# Patient Record
Sex: MALE | Race: WHITE | NOT HISPANIC OR LATINO | Employment: UNEMPLOYED | URBAN - METROPOLITAN AREA
[De-identification: names, ages, dates, MRNs, and addresses within clinical notes are randomized per-mention and may not be internally consistent; named-entity substitution may affect disease eponyms.]

---

## 2024-07-01 ENCOUNTER — OFFICE VISIT (OUTPATIENT)
Dept: URGENT CARE | Facility: URGENT CARE | Age: 24
End: 2024-07-01
Payer: COMMERCIAL

## 2024-07-01 VITALS
RESPIRATION RATE: 19 BRPM | SYSTOLIC BLOOD PRESSURE: 159 MMHG | HEART RATE: 144 BPM | BODY MASS INDEX: 25.87 KG/M2 | HEIGHT: 72 IN | DIASTOLIC BLOOD PRESSURE: 71 MMHG | WEIGHT: 191 LBS | TEMPERATURE: 98.1 F | OXYGEN SATURATION: 100 %

## 2024-07-01 DIAGNOSIS — L60.0 INGROWN TOENAIL OF LEFT FOOT: Primary | ICD-10-CM

## 2024-07-01 DIAGNOSIS — L03.032 PARONYCHIA OF TOE, LEFT: ICD-10-CM

## 2024-07-01 PROCEDURE — 99203 OFFICE O/P NEW LOW 30 MIN: CPT | Performed by: PHYSICIAN ASSISTANT

## 2024-07-01 NOTE — PROGRESS NOTES
"Patient presents with:  Nail Problem: Ingrown toenail, left foot first toe     (L60.0) Ingrown toenail of left foot  (primary encounter diagnosis)  Comment:   Plan: Orthopedic  Referral            (L03.032) Paronychia of toe, left  Comment: (infection)  Plan: amoxicillin-clavulanate (AUGMENTIN) 875-125 MG         tablet            Warm epsom salt soaks twice a day, rinse and pat dry, apply bacitracin and nonstick dressing.  Referral has been placed for podiatry.  I would expect they can see you after you are done with your antibiotic.        At the end of the encounter, I discussed results, diagnosis, medications. Discussed red flags for immediate return to clinic/ER, as well as indications for follow up if no improvement. Patient understood and agreed to plan. Patient was stable for discharge         SUBJECTIVE:   Husam Mills is a 24 year old male who presents with a painful growth on either side of his left great toe, present for \"a few months\".  Denies any trauma.      Denies any fevers.      Current Outpatient Medications   Medication Sig Dispense Refill    Multiple Vitamins-Iron (DAILY-ARAMIS/IRON/BETA-CAROTENE) TABS TAKE 1 TABLET BY MOUTH DAILY. (Patient not taking: Reported on 10/19/2020) 30 tablet 7     Social History     Tobacco Use    Smoking status: Never Smoker    Smokeless tobacco: Never Used   Substance Use Topics    Alcohol use: Not on file     Family History   Problem Relation Age of Onset    Diabetes Mother     Diabetes Father          ROS:    10 point ROS of systems including Constitutional, Eyes, Respiratory, Cardiovascular, Gastroenterology, Genitourinary, Integumentary, Muscularskeletal, Psychiatric ,neurological were all negative except for pertinent positives noted in my HPI       OBJECTIVE:  BP (!) 159/71   Pulse (!) 144   Temp 98.1  F (36.7  C) (Oral)   Resp 19   Ht 1.829 m (6')   Wt 86.6 kg (191 lb)   SpO2 100%   BMI 25.90 kg/m    Physical Exam:  GENERAL APPEARANCE: " healthy, alert and no distress  Extremities: Left great toe no bony abnormality.  No bony tenderness.  Erythema and granulomatous tissue at medial and lateral aspect of the left great toenail.  NEURO: Normal strength and tone, sensory exam grossly normal,  normal speech and mentation  SKIN: Erythema and granulomatous tissue at the medial and lateral aspect of the left great toenail.

## 2024-07-01 NOTE — PATIENT INSTRUCTIONS
(L60.0) Ingrown toenail of left foot  (primary encounter diagnosis)  Comment:   Plan: Orthopedic  Referral            (L03.032) Paronychia of toe, left  Comment: (infection)  Plan: amoxicillin-clavulanate (AUGMENTIN) 875-125 MG         tablet            Warm epsom salt soaks twice a day, rinse and pat dry, apply bacitracin and nonstick dressing.  Referral has been placed for podiatry.  I would expect they can see you after you are done with your antibiotic.

## 2024-07-12 ENCOUNTER — OFFICE VISIT (OUTPATIENT)
Dept: PODIATRY | Facility: CLINIC | Age: 24
End: 2024-07-12
Payer: COMMERCIAL

## 2024-07-12 VITALS — DIASTOLIC BLOOD PRESSURE: 79 MMHG | SYSTOLIC BLOOD PRESSURE: 164 MMHG | BODY MASS INDEX: 25.77 KG/M2 | WEIGHT: 190 LBS

## 2024-07-12 DIAGNOSIS — L60.0 INGROWN TOENAIL OF LEFT FOOT: ICD-10-CM

## 2024-07-12 PROCEDURE — 99203 OFFICE O/P NEW LOW 30 MIN: CPT | Mod: 25 | Performed by: PODIATRIST

## 2024-07-12 PROCEDURE — 11730 AVULSION NAIL PLATE SIMPLE 1: CPT | Mod: TA | Performed by: PODIATRIST

## 2024-07-12 NOTE — PATIENT INSTRUCTIONS
Thank you for choosing [facility] Podiatry / Foot & Ankle Surgery!    DR. CHATMAN'S CLINIC LOCATIONS:     [facility] (Friday) TRIAGE LINE: [phone]   [address] [facility]  APPOINTMENTS: [phone]   [place] [address] RADIOLOGY: [phone]    PHYSICAL THERAPY: [phone]    SET UP SURGERY: [phone]   [place] (Mon-Tues AM-Thurs) BILLING QUESTIONS: [phone]   [address] FAX: [phone]   [place] [address] [facility]: [phone]        - What is an ingrown toenail? An ingrown toenail is a medical condition usually caused by a nail edge irritating the neighboring soft tissue and skin. It can cause pain and lead to infection.  - What causes ingrown toenails? There are likely multiple causes of ingrown toenails, including nail shape and width, narrow shoes, a person s activity level, and likely family history of nail problems.  - Risks of not treating condition: If left untreated, some people endure ongoing tenderness and pain. The biggest concern is infection from bacteria entering through the damage soft tissue.  - How is it treated? Some people achieve relief by soaking their feet and trimming the edge of the nail. If an infection has developed, then an oral antibiotic can be prescribed, but the condition often returns after the course of the medication is completed. Often self treatment is not successful and treatment by a qualified medical provider is needed. This often involves numbing the toe with a local anesthetic and then either removing the edge of a nail or the entire nail.  - Risks of surgery? As with any form of surgery, infections is a risk. However, a person is probably at greater risk for infection if the ingrown toenail is left untreated. Nail removal is a common, simple, and fairly quick procedure that in most cases is done in the physician's office. If an infection exists, often the only treatment that is successful is removal.  "    Ingrown toenail Post-procedural instructions    - After the procedure, go home and elevate the involved foot for the remainder of the day/evening as able.  This is to minimize swelling, control pain, and limit post-procedural complications.  The pre-procedural injection may cause your toe to be numb anywhere from1-2 hours.    - You can take Tylenol, Ibuprofen, Advil, etc as needed for pain if tolerated.  Follow label instructions      - If you have been given a prescription for antibiotics, take them as instructed and complete the prescription.    - Keep dressing intact until the following morning.  At that point, you may remove the bandage (you may need to soak it in warm soapy water as the bandage will likely adhere to your skin).  Soaking in warm soapy water for 5-10 minutes will also facilitate healing.  Wash the toe thoroughly, dry the toe thoroughly.  Apply antibiotic wound ointment to base of wound and cover with 2x2 gauze pads and Coban dressing (not too tightly) until it stops draining(you'll need to purchase the 2x2 gauze pads and 1\" Coban rolls, especially if you had the permanent/chemical procedure performed).  This may take a few days to weeks, but at that point, you may continue with antibiotic ointment and a band-aid, or you may stop applying a dressing all together.  Dressing changes and soaks should be done twice daily(morning/evening) if you had the permanent/chemical procedure done.      -If you had the permanent procedure done, assess the toe at 4 weeks out from the procedure.  If the toe continues to heal/improve, continue twice-daily soaks and dressing changes, and on follow up is needed.  If the toe is no longer showing improvement at 4 weeks out from the procedure, follow up in clinic for a 30 minute appointment, and we'll clean out the procedure site.    - You may do activities to tolerance the following day.  Find a shoe that is comfortable and minimizes the amount of rubbing on your " toe, as this may increase pain, swelling, etc.  Utilize resting, icing, elevating and anti-inflammatories/Tylenol as needed.    - Monitor for signs of infection.  With this procedure, it is common to have mild surrounding redness and drainage.  If the redness involves the entire great toe or if you notice red streaks on top of your foot, or if you experience any nausea, vomiting, chills, fevers > 101 degrees, call clinic for a quick appointment.

## 2024-07-12 NOTE — PROGRESS NOTES
Foot & Ankle Surgery  July 12, 2024    CC: ingrown    I was asked to see Husam Mills regarding the chief complaint by:      HPI:  Pt is a 24 year old male who presents with above complaint.  The patient was seen in  7/1/24 for ingrown bilateral border L hallux.  He was given Augmentin and advised to soak.  The area is still very sore.  He's been soaking twice daily.    ROS:   Pos for CC.  The patient denies current nausea, vomiting, chills, fevers, belly pain, calf pain, chest pain or SOB.  Complete remainder of ROS is otherwise neg.    VITALS:    Vitals:    07/12/24 1525   BP: (!) 164/79   Weight: 86.2 kg (190 lb)       PMH:  No past medical history on file.    SXHX:  No past surgical history on file.     MEDS:    No current outpatient medications on file.     No current facility-administered medications for this visit.       ALL:   No Known Allergies    FMH:  No family history on file.    SocHx:    Social History     Socioeconomic History    Marital status: Single     Spouse name: Not on file    Number of children: Not on file    Years of education: Not on file    Highest education level: Not on file   Occupational History    Not on file   Tobacco Use    Smoking status: Never    Smokeless tobacco: Never   Substance and Sexual Activity    Alcohol use: Not on file    Drug use: Not on file    Sexual activity: Not on file   Other Topics Concern    Not on file   Social History Narrative    Not on file     Social Determinants of Health     Financial Resource Strain: Not on file   Food Insecurity: Not on file   Transportation Needs: Not on file   Physical Activity: Not on file   Stress: Not on file   Social Connections: Not on file   Interpersonal Safety: Not on file   Housing Stability: Not on file           EXAMINATION:  Gen:   No apparent distress  Neuro:   A&Ox3, no deficits  Psych:    Answering questions appropriately for age and situation with normal affect  Head:    NCAT  Eye:    Visual scanning  without deficit  Ear:    Response to auditory stimuli wnl  Lung:    Non-labored breathing on RA noted  Abd:    NTND per patient report  Lymph:    Neg for pitting/non-pitting edema BLE  Vasc:    Pulses palpable, CFT minimally delayed  Neuro:    Light touch sensation intact to all sensory nerve distributions without paresthesias  Derm:    Pronounced onychocryptosis bilateral border left hallux with associated paronychia  MSK:    ROM, strength wnl without limitation, no pain on palpation noted.  Calf:    Neg for redness, swelling or tenderness    Assessment:  24 year old male with pronounced onychocryptosis bilateral border left hallux with paronychia      Medical Decision Making/Plan:  Discussed etiologies, anatomy and options  1.  Mild onychocryptosis bilateral border left hallux with paronychia  -I personally reviewed and interpreted the patient's lower extremity history pertinent to today's visit, including imaging/labs, in preparation for initiating a treatment program.  -Regarding the nail, treatment options were discussed.  They elected to proceed with a procedure, Total temporary avulsion.  See procedure note for details.  Risks that were discussed include but are not limited to infection, wound healing complications, nerve irritation, recurrence of the ingrown nail and the need for further procedures.  Antibiotic:  none needed  -I advised against a permanent removal today based on granuloma and paronychia.  We discussed that if this starts to become symptomatic again, he should come in immediately for permanent removal of 1 or both borders    After discussing the procedure, as well as risks, complications and post-procedure instructions, informed consent was obtained.    Anesthesia:  6 cc's of  1% lidocaine plain    Procedure:  After adequate prep, and with anesthesia achieved,  attention was directed to the left hallux where the nail plate was freed from surrounding soft tissue and then removed in total.  The  base of the wound was explored and showed no necrotic tissue, purulence or debris.   A clean dressing was applied loosely to prevent vascular insult.  The patient tolerated the procedure well without complications.    Post-procedural instructions were dispensed and discussed with the patient.  All questions were answered.          Follow up:  prn or sooner with acute issues      Patient's medical history was reviewed today      Elvin Noonan DPM [facility]  Podiatric Foot & Ankle Surgeon  [facility]  [phone]    Disclaimer: This note consists of symbols derived from keyboarding, dictation and/or voice recognition software. As a result, there may be errors in the script that have gone undetected. Please consider this when interpreting information found in this chart.

## 2024-07-12 NOTE — LETTER
7/12/2024      Husma Landersnilam  [address]  [place] [address]      Dear Colleague,    Thank you for referring your patient, Husam Mills, to the [facility]. Please see a copy of my visit note below.    Foot & Ankle Surgery  July 12, 2024    CC: ingrown    I was asked to see Husam BOSTON Gene regarding the chief complaint by:      HPI:  Pt is a 24 year old male who presents with above complaint.  The patient was seen in  7/1/24 for ingrown bilateral border L hallux.  He was given Augmentin and advised to soak.  The area is still very sore.  He's been soaking twice daily.    ROS:   Pos for CC.  The patient denies current nausea, vomiting, chills, fevers, belly pain, calf pain, chest pain or SOB.  Complete remainder of ROS is otherwise neg.    VITALS:    Vitals:    07/12/24 1525   BP: (!) 164/79   Weight: 86.2 kg (190 lb)       PMH:  No past medical history on file.    SXHX:  No past surgical history on file.     MEDS:    No current outpatient medications on file.     No current facility-administered medications for this visit.       ALL:   No Known Allergies    FMH:  No family history on file.    SocHx:    Social History     Socioeconomic History     Marital status: Single     Spouse name: Not on file     Number of children: Not on file     Years of education: Not on file     Highest education level: Not on file   Occupational History     Not on file   Tobacco Use     Smoking status: Never     Smokeless tobacco: Never   Substance and Sexual Activity     Alcohol use: Not on file     Drug use: Not on file     Sexual activity: Not on file   Other Topics Concern     Not on file   Social History Narrative     Not on file     Social Determinants of Health     Financial Resource Strain: Not on file   Food Insecurity: Not on file   Transportation Needs: Not on file   Physical Activity: Not on file   Stress: Not on file   Social Connections: Not on file   Interpersonal Safety:  Not on file   Housing Stability: Not on file           EXAMINATION:  Gen:   No apparent distress  Neuro:   A&Ox3, no deficits  Psych:    Answering questions appropriately for age and situation with normal affect  Head:    NCAT  Eye:    Visual scanning without deficit  Ear:    Response to auditory stimuli wnl  Lung:    Non-labored breathing on RA noted  Abd:    NTND per patient report  Lymph:    Neg for pitting/non-pitting edema BLE  Vasc:    Pulses palpable, CFT minimally delayed  Neuro:    Light touch sensation intact to all sensory nerve distributions without paresthesias  Derm:    Pronounced onychocryptosis bilateral border left hallux with associated paronychia  MSK:    ROM, strength wnl without limitation, no pain on palpation noted.  Calf:    Neg for redness, swelling or tenderness    Assessment:  24 year old male with pronounced onychocryptosis bilateral border left hallux with paronychia      Medical Decision Making/Plan:  Discussed etiologies, anatomy and options  1.  Mild onychocryptosis bilateral border left hallux with paronychia  -I personally reviewed and interpreted the patient's lower extremity history pertinent to today's visit, including imaging/labs, in preparation for initiating a treatment program.  -Regarding the nail, treatment options were discussed.  They elected to proceed with a procedure, Total temporary avulsion.  See procedure note for details.  Risks that were discussed include but are not limited to infection, wound healing complications, nerve irritation, recurrence of the ingrown nail and the need for further procedures.  Antibiotic:  none needed  -I advised against a permanent removal today based on granuloma and paronychia.  We discussed that if this starts to become symptomatic again, he should come in immediately for permanent removal of 1 or both borders    After discussing the procedure, as well as risks, complications and post-procedure instructions, informed consent was  obtained.    Anesthesia:  6 cc's of  1% lidocaine plain    Procedure:  After adequate prep, and with anesthesia achieved,  attention was directed to the left hallux where the nail plate was freed from surrounding soft tissue and then removed in total.  The base of the wound was explored and showed no necrotic tissue, purulence or debris.   A clean dressing was applied loosely to prevent vascular insult.  The patient tolerated the procedure well without complications.    Post-procedural instructions were dispensed and discussed with the patient.  All questions were answered.          Follow up:  prn or sooner with acute issues      Patient's medical history was reviewed today      Elvin Noonan DPM FACFAS FACFAOM  Podiatric Foot & Ankle Surgeon  [facility]  [phone]    Disclaimer: This note consists of symbols derived from keyboarding, dictation and/or voice recognition software. As a result, there may be errors in the script that have gone undetected. Please consider this when interpreting information found in this chart.          Again, thank you for allowing me to participate in the care of your patient.        Sincerely,        Elvin Noonan DPM, FRAN